# Patient Record
Sex: FEMALE | Race: WHITE | NOT HISPANIC OR LATINO
[De-identification: names, ages, dates, MRNs, and addresses within clinical notes are randomized per-mention and may not be internally consistent; named-entity substitution may affect disease eponyms.]

---

## 2018-11-06 ENCOUNTER — APPOINTMENT (OUTPATIENT)
Dept: COLORECTAL SURGERY | Facility: CLINIC | Age: 28
End: 2018-11-06
Payer: COMMERCIAL

## 2018-11-06 VITALS
HEART RATE: 82 BPM | TEMPERATURE: 98.8 F | HEIGHT: 65 IN | WEIGHT: 140 LBS | DIASTOLIC BLOOD PRESSURE: 76 MMHG | BODY MASS INDEX: 23.32 KG/M2 | SYSTOLIC BLOOD PRESSURE: 109 MMHG

## 2018-11-06 DIAGNOSIS — K64.5 PERIANAL VENOUS THROMBOSIS: ICD-10-CM

## 2018-11-06 DIAGNOSIS — K58.9 IRRITABLE BOWEL SYNDROME W/OUT DIARRHEA: ICD-10-CM

## 2018-11-06 DIAGNOSIS — Z83.3 FAMILY HISTORY OF DIABETES MELLITUS: ICD-10-CM

## 2018-11-06 DIAGNOSIS — Z82.61 FAMILY HISTORY OF ARTHRITIS: ICD-10-CM

## 2018-11-06 DIAGNOSIS — Z78.9 OTHER SPECIFIED HEALTH STATUS: ICD-10-CM

## 2018-11-06 DIAGNOSIS — Z80.3 FAMILY HISTORY OF MALIGNANT NEOPLASM OF BREAST: ICD-10-CM

## 2018-11-06 DIAGNOSIS — Z80.7 FAMILY HISTORY OF OTHER MALIGNANT NEOPLASMS OF LYMPHOID, HEMATOPOIETIC AND RELATED TISSUES: ICD-10-CM

## 2018-11-06 PROBLEM — Z00.00 ENCOUNTER FOR PREVENTIVE HEALTH EXAMINATION: Status: ACTIVE | Noted: 2018-11-06

## 2018-11-06 PROCEDURE — 99202 OFFICE O/P NEW SF 15 MIN: CPT | Mod: 25

## 2018-11-06 PROCEDURE — 46600 DIAGNOSTIC ANOSCOPY SPX: CPT

## 2018-11-06 RX ORDER — HYDROCORTISONE 25 MG/G
2.5 CREAM TOPICAL TWICE DAILY
Qty: 1 | Refills: 3 | Status: ACTIVE | COMMUNITY
Start: 2018-11-06 | End: 1900-01-01

## 2021-12-01 ENCOUNTER — NON-APPOINTMENT (OUTPATIENT)
Age: 31
End: 2021-12-01

## 2021-12-02 ENCOUNTER — APPOINTMENT (OUTPATIENT)
Dept: COLORECTAL SURGERY | Facility: CLINIC | Age: 31
End: 2021-12-02
Payer: COMMERCIAL

## 2021-12-02 VITALS
WEIGHT: 150 LBS | BODY MASS INDEX: 24.69 KG/M2 | DIASTOLIC BLOOD PRESSURE: 92 MMHG | HEIGHT: 65.5 IN | SYSTOLIC BLOOD PRESSURE: 143 MMHG | HEART RATE: 106 BPM

## 2021-12-02 DIAGNOSIS — K64.4 RESIDUAL HEMORRHOIDAL SKIN TAGS: ICD-10-CM

## 2021-12-02 PROCEDURE — 46600 DIAGNOSTIC ANOSCOPY SPX: CPT

## 2021-12-02 PROCEDURE — 99203 OFFICE O/P NEW LOW 30 MIN: CPT | Mod: 25

## 2021-12-02 RX ORDER — BUPROPION HYDROCHLORIDE 150 MG/1
150 TABLET, FILM COATED, EXTENDED RELEASE ORAL
Refills: 0 | Status: ACTIVE | COMMUNITY

## 2021-12-02 RX ORDER — NORGESTIMATE AND ETHINYL ESTRADIOL 0.25-0.035
0.25-35 KIT ORAL
Refills: 0 | Status: ACTIVE | COMMUNITY

## 2021-12-02 RX ORDER — SPIRONOLACTONE 50 MG/1
50 TABLET ORAL
Refills: 0 | Status: ACTIVE | COMMUNITY

## 2021-12-02 NOTE — ASSESSMENT
[FreeTextEntry1] : Exam findings and diagnosis were discussed at length with patient.\par Recommend supportive care. Continue medical management of IBS. Discussed hydrocortisone cream or witch hazel as needed for symptoms.\par Recommend avoiding thong underwear.\par Alternative management of surgical removal discussed. Perioperative expectations discussed. Patient states her symtpoms are mild and does not want to pursue surgery.\par All questions were answered, patient expressed understanding.\par \par

## 2021-12-02 NOTE — HISTORY OF PRESENT ILLNESS
[FreeTextEntry1] : 30 yo F presents for f/u hemorrhoids\par \par Last seen in the office on 11/6/18. Left posterior midline small thrombosed external hemorrhoid noted on exam. Advised to begin using hydrocortisone cream 1-2 times daily\par \par In interim, seen by another provider outside of [place] in early 2021 for anal fissure, started on Nifedipine 0.2%/Lidocaine 5% which she used TID for 6-8 weeks w/ improvement in symptoms. In the last 2-3 months, she experienced sharp pain and some BRB on TP, she restarted compound for one week w/ resolution of symptoms.\par \par Pt reports external hemorrhoids is always present and occasionally enlarged. She intermittently uses Prep H with improvement in swelling\par Denies pain, itching or irritation\par She is interested in removal of hemorrhoids\par \par BH: daily to every 3 days, denies straining or constipation\par h/o mild IBS w/ abd bloating, limits trigger foods (garlic, onions, brussel sprouts)\par Reports adequate dietary fiber intake and water\par Takes magnesium citrate daily\par Denies stool softeners, fiber supplements or laxatives\par  \par Never had a colonoscopy\par Denies [facility] CRC, father w/ prostate CA\par Denies ASA use. Took Advil yesterday\par \par  Reports daily thong underwear use.

## 2021-12-02 NOTE — PHYSICAL EXAM
[FreeTextEntry1] : Medical assistant present for duration of physical examination\par \par General no acute distress, alert and oriented\par Psych calm, pleasant demeanor, responding appropriately to questions\par Nonlabored breathing\par Ambulating without assistance\par Skin normal color and pigment, no visible lesions or rashes\par \par Anorectal Exam:\par Inspection no erythema, induration or fluctuance, no skin excoriation, no fissure, small residual anterior midline and posterior midline external hemorrhoidal tags without inflammation or thrombosis at anal verge \par CRISTAL nontender, no masses palpated, no blood on gloved finger\par \par Procedure: Anoscopy\par \par Pre procedure Diagnosis: hemorrhoids\par Post procedure Diagnosis: hemorrhoids\par Anesthesia: none\par Estimated blood loss: none\par Specimen: none\par Complications: none\par \par Consent obtained. Anoscopy was performed by passing a lighted anoscope with lubricant jelly into the anal canal and the entire anal mucosal surface was inspected. Findings included no fissure, no internal hemorrhoidal inflammation\par \par Patient tolerated examination and procedure well.\par \par \par

## 2023-04-20 ENCOUNTER — APPOINTMENT (OUTPATIENT)
Dept: COLORECTAL SURGERY | Facility: CLINIC | Age: 33
End: 2023-04-20
Payer: COMMERCIAL

## 2023-04-20 VITALS
SYSTOLIC BLOOD PRESSURE: 119 MMHG | DIASTOLIC BLOOD PRESSURE: 75 MMHG | WEIGHT: 146 LBS | BODY MASS INDEX: 24.03 KG/M2 | HEART RATE: 78 BPM | TEMPERATURE: 98.5 F | HEIGHT: 65.5 IN

## 2023-04-20 DIAGNOSIS — K64.9 UNSPECIFIED HEMORRHOIDS: ICD-10-CM

## 2023-04-20 PROCEDURE — 46600 DIAGNOSTIC ANOSCOPY SPX: CPT

## 2023-04-20 RX ORDER — HYDROCORTISONE 25 MG/G
2.5 CREAM TOPICAL
Qty: 1 | Refills: 3 | Status: ACTIVE | COMMUNITY
Start: 2023-04-20 | End: 1900-01-01

## 2023-04-20 NOTE — ASSESSMENT
[FreeTextEntry1] : Recommendations including increased fiber intake, adequate daily hydration, stool softeners as needed, and sitz baths as needed and after bowel movements were discussed.\par Avoid constipation and diarrhea, avoid pushing/straining.\par Medical management, such as preparation H or  hydrocortisone cream, was discussed as needed for symptoms. \par F/u if symptoms worsen or persist. \par All questions answered, patient expressed understanding and is agreeable to this plan.\par

## 2023-04-20 NOTE — PHYSICAL EXAM
[FreeTextEntry1] : Medical assistant present for duration of physical examination\par \par General no acute distress, alert and oriented\par Psych in good spirits\par Nonlabored breathing\par Ambulating without assistance\par Skin no visible skin changes\par \par Anorectal Exam:\par Inspection no cellulitis, no fissure, small residual anterior midline external tag without inflammation or thrombosis at anal verge \par CRISTAL nontender, no masses palpated, no blood on gloved finger\par \par Procedure: Anoscopy\par \par Pre procedure Diagnosis: hemorrhoids\par Post procedure Diagnosis: hemorrhoids\par Anesthesia: none\par Estimated blood loss: none\par Specimen: none\par Complications: none\par \par Consent obtained. Anoscopy was performed by passing a lighted anoscope with lubricant jelly into the anal canal and the entire anal mucosal surface was inspected. Findings included no fissure, mild internal hemorrhoids\par \par Patient tolerated examination and procedure well.\par \par \par

## 2024-03-26 NOTE — HISTORY OF PRESENT ILLNESS
[FreeTextEntry1] : 32 yo F presents for follow up\par \par Initially seen 11/6/18. Left posterior midline small thrombosed external hemorrhoid noted on exam. Advised to begin using hydrocortisone cream 1-2 times daily\par \par In interim, seen by another provider outside of [place] in early 2021 for anal fissure, started on Nifedipine 0.2%/Lidocaine 5% which she used TID for 6-8 weeks w/ improvement in symptoms. In the last 2-3 months, she experienced sharp pain and some BRB on TP, she restarted compound for one week w/ resolution of symptoms.\par \par Last seen 12/2/21 c/o external hemorrhoids that occasionally swell which improve w/ use of prep H.\par Exam noted small residual anterior midline and posterior midline external hemorrhoidal tags w/o inflammation or thrombosis. No internal hemorrhoidal inflammation noted.\par \par Recommended supportive care and management of IBS. Recommend HC cream or witch hazel as needed and avoidance of wearing thong. Discussed surgical options, pt opted to not pursue surgery as symptoms are mild\par \par Pt had been doing well w/ occasional scant BRB on TP in past years. A couple weeks ago she started experiencing painless rectal bleeding w/ few drops in toilet bowl which prompted her to make this appt and she used Prep H suppository once and cream twice. h/o slight swelling w/ some BMs, but not bothersome. No bleeding observed for the last few days.\par \par BH: daily to every other day. Rare instances of constipation which she \par Has met w/ Nutritionist, eats lots of vegetables and no longer experiences abd pain\par Takes magnesium supplement\par \par Took Advil in the past week, denies ASA use Products Recommended: Suncreen with Zinc or Titanium Dioxide General Sunscreen Counseling: I recommended a broad spectrum sunscreen with a SPF of 30 or higher.  I explained that SPF 30 sunscreens block approximately 97 percent of the sun's harmful rays.  Sunscreens should be applied at least 15 minutes prior to expected sun exposure and then every 2 hours after that as long as sun exposure continues. If swimming or exercising sunscreen should be reapplied every 45 minutes to an hour after getting wet or sweating.  One ounce, or the equivalent of a shot glass full of sunscreen, is adequate to protect the skin not covered by a bathing suit. I also recommended a lip balm with a sunscreen as well. Sun protective clothing can be used in lieu of sunscreen but must be worn the entire time you are exposed to the sun's rays. Detail Level: Zone

## 2025-03-13 ENCOUNTER — APPOINTMENT (OUTPATIENT)
Dept: COLORECTAL SURGERY | Facility: CLINIC | Age: 35
End: 2025-03-13
Payer: COMMERCIAL

## 2025-03-13 ENCOUNTER — NON-APPOINTMENT (OUTPATIENT)
Age: 35
End: 2025-03-13

## 2025-03-13 VITALS
OXYGEN SATURATION: 99 % | BODY MASS INDEX: 24.03 KG/M2 | HEIGHT: 65.5 IN | WEIGHT: 146 LBS | DIASTOLIC BLOOD PRESSURE: 79 MMHG | SYSTOLIC BLOOD PRESSURE: 116 MMHG | HEART RATE: 96 BPM

## 2025-03-13 DIAGNOSIS — K64.5 PERIANAL VENOUS THROMBOSIS: ICD-10-CM

## 2025-03-13 PROCEDURE — 46600 DIAGNOSTIC ANOSCOPY SPX: CPT

## 2025-03-13 PROCEDURE — 99203 OFFICE O/P NEW LOW 30 MIN: CPT | Mod: 25
